# Patient Record
Sex: MALE | Race: WHITE | NOT HISPANIC OR LATINO | URBAN - METROPOLITAN AREA
[De-identification: names, ages, dates, MRNs, and addresses within clinical notes are randomized per-mention and may not be internally consistent; named-entity substitution may affect disease eponyms.]

---

## 2018-03-22 ENCOUNTER — EMERGENCY (EMERGENCY)
Facility: HOSPITAL | Age: 63
LOS: 1 days | End: 2018-03-22
Payer: COMMERCIAL

## 2018-03-22 PROCEDURE — 99284 EMERGENCY DEPT VISIT MOD MDM: CPT

## 2018-03-22 PROCEDURE — 70450 CT HEAD/BRAIN W/O DYE: CPT | Mod: 26

## 2019-07-05 ENCOUNTER — APPOINTMENT (OUTPATIENT)
Dept: RADIOLOGY | Facility: CLINIC | Age: 64
End: 2019-07-05
Payer: COMMERCIAL

## 2019-07-05 PROBLEM — Z00.00 ENCOUNTER FOR PREVENTIVE HEALTH EXAMINATION: Status: ACTIVE | Noted: 2019-07-05

## 2019-07-05 PROCEDURE — 72040 X-RAY EXAM NECK SPINE 2-3 VW: CPT

## 2019-07-15 ENCOUNTER — APPOINTMENT (OUTPATIENT)
Dept: MRI IMAGING | Facility: CLINIC | Age: 64
End: 2019-07-15

## 2019-07-23 ENCOUNTER — APPOINTMENT (OUTPATIENT)
Dept: NEUROSURGERY | Facility: CLINIC | Age: 64
End: 2019-07-23
Payer: COMMERCIAL

## 2019-07-23 VITALS
WEIGHT: 215 LBS | BODY MASS INDEX: 34.55 KG/M2 | HEIGHT: 66 IN | DIASTOLIC BLOOD PRESSURE: 78 MMHG | HEART RATE: 82 BPM | SYSTOLIC BLOOD PRESSURE: 130 MMHG

## 2019-07-23 DIAGNOSIS — Z86.39 PERSONAL HISTORY OF OTHER ENDOCRINE, NUTRITIONAL AND METABOLIC DISEASE: ICD-10-CM

## 2019-07-23 DIAGNOSIS — M51.16 INTERVERTEBRAL DISC DISORDERS WITH RADICULOPATHY, LUMBAR REGION: ICD-10-CM

## 2019-07-23 DIAGNOSIS — Z82.49 FAMILY HISTORY OF ISCHEMIC HEART DISEASE AND OTHER DISEASES OF THE CIRCULATORY SYSTEM: ICD-10-CM

## 2019-07-23 DIAGNOSIS — Z83.3 FAMILY HISTORY OF DIABETES MELLITUS: ICD-10-CM

## 2019-07-23 DIAGNOSIS — Z78.9 OTHER SPECIFIED HEALTH STATUS: ICD-10-CM

## 2019-07-23 PROCEDURE — 99204 OFFICE O/P NEW MOD 45 MIN: CPT

## 2019-07-23 RX ORDER — CYCLOBENZAPRINE HYDROCHLORIDE 10 MG/1
10 TABLET, FILM COATED ORAL
Refills: 0 | Status: ACTIVE | COMMUNITY

## 2019-07-23 RX ORDER — AMLODIPINE BESYLATE 5 MG/1
5 TABLET ORAL
Refills: 0 | Status: ACTIVE | COMMUNITY

## 2019-07-23 RX ORDER — AZILSARTAN KAMEDOXOMIL AND CHLORTHALIDONE 40; 25 MG/1; MG/1
TABLET ORAL
Refills: 0 | Status: ACTIVE | COMMUNITY

## 2019-07-23 RX ORDER — MELOXICAM 15 MG/1
15 TABLET ORAL
Qty: 30 | Refills: 1 | Status: ACTIVE | COMMUNITY
Start: 2019-07-23 | End: 1900-01-01

## 2019-07-23 RX ORDER — METHYLPREDNISOLONE 4 MG/1
4 TABLET ORAL
Refills: 0 | Status: ACTIVE | COMMUNITY

## 2019-07-23 NOTE — PLAN
[FreeTextEntry1] : DIAGNOSIS:  LUMBAR DISK HERNIATION  / STENOSIS L45  RIGHT\par TREATMENT PLAN:  NON-SURGICAL  VS    L4-L5   RIGHT   MICRODISKECTOMY \par \par This is a patient with lumbar herniated disk and radiculopathy.   I have recommended nonsurgical management at this time.  This consists of physical therapy and/or manual medicine in conjunction to medical therapy and other conservative methods.  These include the consideration of trigger point injections and or the utilization of modalities such as TENS where applicable.  The next tier would be the referral to a pain management specialist (anesthesia or physiatry) for the consideration of spinal injections.  This includes the options of epidural steroids, facet injections as well as other novel techniques that may provide pain relief.  \par \par If all nonsurgical methods fail , I have recommended lumbar microdiskectomy  as a treatment option.  This entails removing the lamina and the medial facet joint along with the underlying hypertrophied ligamentum flavum and retrieving the herniated disk fragment as well as removing any weakened or damaged disk material at this level.  This will allow for a widening of the spinal canal and the neuroforamen at the effected level thus decompressing the nerve root. This should not result in added instability to the spine at this level.  This will not require the need for instrumented stabilization and fusion. \par \par Risks and benefits of surgery were described to the patient in detail which include but not excluding those otherwise not mentioned:  coma paralysis, death, stroke, spinal fluid leak, nerve injury, weakness, infection, spinal instability, vascular injury, re-herniation, adjacent segment degeneration and persistent pain.   \par \par I have reviewed the images in detail with the patient today in my office and have answered all questions regarding this condition to the best of my ability to the patient’s satisfaction.

## 2019-07-23 NOTE — RESULTS/DATA
[FreeTextEntry1] : [facility] MRI was reviewed showing extremely large herniated disc at L4-5. That's elements that are right and left-sided.

## 2019-07-23 NOTE — REASON FOR VISIT
[New Patient Visit] : a new patient visit [Referred By: _________] : Patient was referred by WAN [Spouse] : spouse [FreeTextEntry1] : LOWER BACK PAIN- ANGER IMAGING.

## 2019-07-23 NOTE — PHYSICAL EXAM
[Straight-Leg Raise Test - Left] : straight leg raise of the left leg was negative [Straight-Leg Raise Test - Right] : straight leg raise  of the right leg was negative [Normal] : normal [Able to toe walk] : the patient was able to toe walk [Able to heel walk] : the patient was able to heel walk [Intact] : all sensory within normal limits bilaterally

## 2022-08-02 ENCOUNTER — EMERGENCY (EMERGENCY)
Facility: HOSPITAL | Age: 67
LOS: 1 days | Discharge: HOME | End: 2022-08-02
Admitting: EMERGENCY MEDICINE

## 2022-08-02 DIAGNOSIS — S09.8XXA OTHER SPECIFIED INJURIES OF HEAD, INITIAL ENCOUNTER: ICD-10-CM
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
## 2022-08-02 DIAGNOSIS — S69.82XA OTHER SPECIFIED INJURIES OF LEFT WRIST, HAND AND FINGER(S), INITIAL ENCOUNTER: ICD-10-CM

## 2022-08-02 PROCEDURE — L9991: CPT

## 2024-03-26 ENCOUNTER — APPOINTMENT (OUTPATIENT)
Dept: ORTHOPEDIC SURGERY | Facility: CLINIC | Age: 69
End: 2024-03-26
Payer: MEDICARE

## 2024-03-26 VITALS — BODY MASS INDEX: 32.14 KG/M2 | HEIGHT: 66 IN | WEIGHT: 200 LBS

## 2024-03-26 DIAGNOSIS — I10 ESSENTIAL (PRIMARY) HYPERTENSION: ICD-10-CM

## 2024-03-26 DIAGNOSIS — Z78.9 OTHER SPECIFIED HEALTH STATUS: ICD-10-CM

## 2024-03-26 PROCEDURE — 99203 OFFICE O/P NEW LOW 30 MIN: CPT

## 2024-03-26 NOTE — DISCUSSION/SUMMARY
[de-identified] : Lengthy discussion regarding options was had with the patient. Nonsurgical options including but not limited to cortisone, Visco supplementation, anti-inflammatory medications (both steroidal and non-steroidal), activity modification, non-impact exercise, maintaining a healthy BMI, bracing, and icing were reviewed. Surgical options including but not limited to arthroscopy, and    were discussed as was risks, benefits and alternatives.    PATIENT HAS BEEN PARTICIPATING IN PHYSICAL THERAPY SINCE JANUARY   , CONTINUE PT & HEP + MM ON EXAM  WILL ORDER MRI TO EVAL FOR MED MENISCUS TEAR

## 2024-03-26 NOTE — PHYSICAL EXAM
[Positive] : positive Radha [] : antalgic [Right] : right knee [All Views] : anteroposterior, lateral, skyline, and anteroposterior standing [Outside films reviewed] : Outside films reviewed [FreeTextEntry9] : OALI medial joint space narrowing.   STILL OPEN . No fractures seen [de-identified] : extension 0 degrees [TWNoteComboBox7] : flexion 120 degrees

## 2024-03-26 NOTE — HISTORY OF PRESENT ILLNESS
[de-identified] : Date of Injury/Onset:     4 MONTHS  MADE IT THROUGH THE SEASON THOUGH DIFFICULT oali dR [facility] 1/18/24 STARTED PT LIMITED ACTIVITY SINCE LAST VISIT Pain: At Rest: 5/10   With Activity: 10/10 Affecting Sleep: Y Difficulty with stairs: Y Difficulty getting in and out of car:Y Sit to stand stiffness: Y Affects walking short/long distances?Y Home/Work/Recreation effected?  Y Mechanism of injury: NKI This  is not a Work-Related Injury being treated under Worker's Compensation. This is not   an athletic injury  SYMPTOMS:  C/O MEDIAL SIDED PAIN, SOME SWELLING KEN AFTER REFEREEING, SOME INSTABILTY Improves with:    NOTHING Worse with:   RUNNING Have you been treated for this in the past? Y Have you had surgery for this in the past?N OTC Medicines: ADVIL/ALEVE/MELOXICAM RX medicines: Heat, Ice, Elevation: ICE CSI or Gel Injections:  (Indicate which)CSI- 1/18/24-  HELPED FOR A FEW WEEKS Physical Therapy/ HEP:  Y SINCE 1/24 Previous Treatment/Imaging/Studies Since Last Visit: XR OALI, KNEE SLEEVE Reports Available for Review Today:

## 2024-03-27 ENCOUNTER — RESULT REVIEW (OUTPATIENT)
Age: 69
End: 2024-03-27

## 2024-04-09 ENCOUNTER — APPOINTMENT (OUTPATIENT)
Dept: ORTHOPEDIC SURGERY | Facility: CLINIC | Age: 69
End: 2024-04-09
Payer: MEDICARE

## 2024-04-09 DIAGNOSIS — M25.561 PAIN IN RIGHT KNEE: ICD-10-CM

## 2024-04-09 DIAGNOSIS — S83.241A OTHER TEAR OF MEDIAL MENISCUS, CURRENT INJURY, RIGHT KNEE, INITIAL ENCOUNTER: ICD-10-CM

## 2024-04-09 PROCEDURE — 99214 OFFICE O/P EST MOD 30 MIN: CPT

## 2024-04-09 NOTE — DISCUSSION/SUMMARY
[de-identified] : I spent time going in detail the problem and the associated risks/benefits of RIGHT KNEE MUKA surgery vs RIGHT KNEE TKA. RIGHT KNEE Arthroscopy not indicated at this time. Went into detailed discussion of complications including but not limited to, nerve injury, non-union, repeat fracture, DVT /PE /pe postop, instability, transfusion, infection, NVA injury, stiffness, leg length discrepancy, inability to ambulate & death. Discussed implant and model shown to patient. Patient had ample time to ask any questions, and at this time answered all patient questions, should anymore arise they were instructed to follow up. Patient expressed understanding of the proposed procedure and postop directions.  Lengthy discussion regarding options was had with the patient. Nonsurgical options including but not limited to cortisone, Visco supplementation, anti-inflammatory medications (both steroidal and non-steroidal), activity modification, non-impact exercise, maintaining a healthy BMI, bracing, and icing were reviewed. Surgical options including but not limited to arthroscopy, and joint replacement were discussed as was risks, benefits and alternatives.  Discussed importance of non-impact exercise and muscle stretching before and after exercise. Stretching exercises shown, Explained the importance of Range of Motion before strength.   I would recommend RIGHT KNEE MUKA at this time.   Today's plan is to proceed with RIGHT MUKA possible RIGHT TKA

## 2024-04-09 NOTE — PHYSICAL EXAM
[Right] : right knee [NL (0)] : extension 0 degrees [5___] : hamstring 5[unfilled]/5 [] : patient ambulates without assistive device [FreeTextEntry3] : mild varus alignment  [TWNoteComboBox7] : flexion 120 degrees

## 2024-04-09 NOTE — HISTORY OF PRESENT ILLNESS
[de-identified] : Date of Injury/Onset:     4 MONTHS  MADE IT THROUGH THE SEASON THOUGH DIFFICULT oali dR [facility]ard CSI 1/18/24 STARTED PT LIMITED ACTIVITY SINCE LAST VISIT Pain: At Rest: 5/10   With Activity: 10/10 Affecting Sleep: Y Difficulty with stairs: Y Difficulty getting in and out of car:Y Sit to stand stiffness: Y Affects walking short/long distances?Y Home/Work/Recreation effected?  Y Mechanism of injury: NKI This  is not a Work-Related Injury being treated under Worker's Compensation. This is not   an athletic injury  SYMPTOMS:  C/O MEDIAL SIDED PAIN, SOME SWELLING KEN AFTER REFEREEING, SOME INSTABILTY Improves with:    NOTHING Worse with:   RUNNING Have you been treated for this in the past? Y Have you had surgery for this in the past?N OTC Medicines: ADVIL/ALEVE/MELOXICAM RX medicines: Heat, Ice, Elevation: ICE CSI or Gel Injections:  (Indicate which)CSI- 1/18/24-  HELPED FOR A FEW WEEKS Physical Therapy/ HEP:  Y SINCE 1/24 Previous Treatment/Imaging/Studies Since Last Visit: XR OALI, KNEE SLEEVE Reports Available for Review Today: MRI report from 3/27/2024 is as follows:   Tricompartmental degenerative changes, most pronounced in the medial femorotibial compartment where there is extensive full-thickness cartilage loss.  Horizontal tear involving the body and posterior horn medial meniscus. Large radial tear at the junction of the posterior horn and posterior ligament medial meniscus.  Small knee joint effusion. Small popliteal cyst with adjacent fluid suggesting leakage/rupture.

## 2024-04-09 NOTE — REASON FOR VISIT
[FreeTextEntry2] : here for f/u right knee mri results .  states knee pain is same. states he is uncomfortable at night d/t knee pain.  states he stopped PT d/t not improving.  using ice and tylenol for pain.  tried meloxicam but didnt help.

## 2024-04-16 ENCOUNTER — TRANSCRIPTION ENCOUNTER (OUTPATIENT)
Age: 69
End: 2024-04-16

## 2024-05-22 ENCOUNTER — NON-APPOINTMENT (OUTPATIENT)
Age: 69
End: 2024-05-22

## 2024-05-29 ENCOUNTER — RESULT REVIEW (OUTPATIENT)
Age: 69
End: 2024-05-29

## 2024-05-29 ENCOUNTER — APPOINTMENT (OUTPATIENT)
Dept: ORTHOPEDIC SURGERY | Facility: HOSPITAL | Age: 69
End: 2024-05-29
Payer: MEDICARE

## 2024-05-29 PROCEDURE — 27446 REVISION OF KNEE JOINT: CPT | Mod: RT

## 2024-05-29 PROCEDURE — 20985 CPTR-ASST DIR MS PX: CPT

## 2024-06-05 ENCOUNTER — NON-APPOINTMENT (OUTPATIENT)
Age: 69
End: 2024-06-05

## 2024-06-14 ENCOUNTER — APPOINTMENT (OUTPATIENT)
Dept: ORTHOPEDIC SURGERY | Facility: CLINIC | Age: 69
End: 2024-06-14
Payer: MEDICARE

## 2024-06-14 DIAGNOSIS — M17.11 UNILATERAL PRIMARY OSTEOARTHRITIS, RIGHT KNEE: ICD-10-CM

## 2024-06-14 PROCEDURE — 99024 POSTOP FOLLOW-UP VISIT: CPT

## 2024-06-14 NOTE — PHYSICAL EXAM
[Right] : right knee [NL (0)] : extension 0 degrees [5___] : hamstring 5[unfilled]/5 [] : no extensor lag [FreeTextEntry3] : mild varus alignment  [TWNoteComboBox7] : flexion 120 degrees

## 2024-06-14 NOTE — DISCUSSION/SUMMARY
[de-identified] : Discussed importance of non-impact exercise and muscle stretching before and after exercise. Explained the importance of ice and rest.  Pt is doing well and is to continue with treatment plan. Pt was shown exercises and stretches that can help increase  ROM and strength.  Patient will begin OPPT at this time, given Rx for this.  F/u in 4 weeks   Today's plan is to proceed with RIGHT MUKA possible RIGHT TKA

## 2024-06-14 NOTE — HISTORY OF PRESENT ILLNESS
[de-identified] : Patient presents in office today S/p RT knee sx 05/29/2024. Denies fever, chills, shortness of breath. Ambulating well without assistance. States he started out patient PT yesterday at [facility] -

## 2024-07-03 ENCOUNTER — NON-APPOINTMENT (OUTPATIENT)
Age: 69
End: 2024-07-03

## 2024-07-11 ENCOUNTER — APPOINTMENT (OUTPATIENT)
Dept: ORTHOPEDIC SURGERY | Facility: CLINIC | Age: 69
End: 2024-07-11
Payer: MEDICARE

## 2024-07-11 DIAGNOSIS — Z96.651 PRESENCE OF RIGHT ARTIFICIAL KNEE JOINT: ICD-10-CM

## 2024-07-11 PROCEDURE — 73562 X-RAY EXAM OF KNEE 3: CPT | Mod: RT

## 2024-07-11 PROCEDURE — 99024 POSTOP FOLLOW-UP VISIT: CPT

## 2024-08-12 ENCOUNTER — NON-APPOINTMENT (OUTPATIENT)
Age: 69
End: 2024-08-12

## 2024-09-03 ENCOUNTER — APPOINTMENT (OUTPATIENT)
Dept: ORTHOPEDIC SURGERY | Facility: CLINIC | Age: 69
End: 2024-09-03
Payer: MEDICARE

## 2024-09-03 DIAGNOSIS — Z96.651 PRESENCE OF RIGHT ARTIFICIAL KNEE JOINT: ICD-10-CM

## 2024-09-03 PROCEDURE — 73562 X-RAY EXAM OF KNEE 3: CPT | Mod: RT

## 2024-09-03 PROCEDURE — 99214 OFFICE O/P EST MOD 30 MIN: CPT

## 2024-09-03 RX ORDER — MELOXICAM 15 MG/1
15 TABLET ORAL
Qty: 20 | Refills: 0 | Status: ACTIVE | COMMUNITY
Start: 2024-09-03

## 2024-09-03 NOTE — REASON FOR VISIT
[FreeTextEntry2] : S/p 05/29/24 medial UNI right knee. Patient reports stiffness in the morning and takes Tylenol as needed. Patient is attending PT 2x a week.  show

## 2024-09-03 NOTE — PHYSICAL EXAM
[NL (0)] : extension 0 degrees [5___] : hamstring 5[unfilled]/5 [Right] : right knee [AP] : anteroposterior [Lateral] : lateral [[place]] : skyline [] : non-antalgic [de-identified] :   Weak with Single Step Up  [FreeTextEntry9] : Well-aligned, well-fixed prosthesis. No lucency noted. Patella centered.  [TWNoteComboBox7] : flexion 125 degrees

## 2024-09-03 NOTE — DISCUSSION/SUMMARY
[de-identified] : The patient was advised of the diagnosis. The natural history of the pathology was explained in full to the patient in layman's terms. Several different treatment options were discussed and explained in full to the patient including the risks and benefits of both surgical and non-surgical treatments. All questions and concerns were answered.  Discussed importance of non-impact exercise and muscle stretching before and after exercise. Reviewed x-rays Explained the importance of ice and rest. Stretching exercises shown, Explained the importance of Range of Motion before strength. Continue home strengthening and stretching program consisting of non-impact exercises and ice as needed.   At this time after discussion of the options, the patient would benefit from continued organized Physical Therapy to increase overall functionality. The patient was provided with an Rx in office today and was instructed to attend PT for 6-8 weeks in order to increase strength and ROM. Patient agreed with this plan and will continue PT  Patient was provided a prescription for Meloxicam 15mg today. Patient was instructed to take one as needed for pain. Patient was advised on medication risks associated with anti inflammatory medications.  Patient can return to work at this time with no restrictions.  Follow up 9 months.

## 2024-10-26 ENCOUNTER — NON-APPOINTMENT (OUTPATIENT)
Age: 69
End: 2024-10-26

## 2024-11-15 RX ORDER — MELOXICAM 15 MG/1
15 TABLET ORAL
Qty: 30 | Refills: 0 | Status: ACTIVE | COMMUNITY
Start: 2024-11-15 | End: 1900-01-01

## 2024-12-25 PROBLEM — F10.90 ALCOHOL USE: Status: ACTIVE | Noted: 2019-07-23

## 2025-03-25 ENCOUNTER — NON-APPOINTMENT (OUTPATIENT)
Age: 70
End: 2025-03-25

## 2025-05-27 ENCOUNTER — APPOINTMENT (OUTPATIENT)
Dept: ORTHOPEDIC SURGERY | Facility: CLINIC | Age: 70
End: 2025-05-27
Payer: MEDICARE

## 2025-05-27 VITALS — BODY MASS INDEX: 32.14 KG/M2 | HEIGHT: 66 IN | WEIGHT: 200 LBS

## 2025-05-27 DIAGNOSIS — Z96.651 PRESENCE OF RIGHT ARTIFICIAL KNEE JOINT: ICD-10-CM

## 2025-05-27 PROCEDURE — 73562 X-RAY EXAM OF KNEE 3: CPT | Mod: RT

## 2025-05-27 PROCEDURE — 99213 OFFICE O/P EST LOW 20 MIN: CPT

## 2025-05-27 RX ORDER — MELOXICAM 15 MG/1
15 TABLET ORAL
Qty: 20 | Refills: 0 | Status: ACTIVE | COMMUNITY
Start: 2025-05-27 | End: 1900-01-01

## 2025-07-02 ENCOUNTER — NON-APPOINTMENT (OUTPATIENT)
Age: 70
End: 2025-07-02